# Patient Record
Sex: FEMALE | Race: WHITE | URBAN - METROPOLITAN AREA
[De-identification: names, ages, dates, MRNs, and addresses within clinical notes are randomized per-mention and may not be internally consistent; named-entity substitution may affect disease eponyms.]

---

## 2021-08-28 ENCOUNTER — TELEPHONE ENCOUNTER (OUTPATIENT)
Dept: URBAN - METROPOLITAN AREA CLINIC 13 | Facility: CLINIC | Age: 61
End: 2021-08-28

## 2021-08-29 ENCOUNTER — TELEPHONE ENCOUNTER (OUTPATIENT)
Dept: URBAN - METROPOLITAN AREA CLINIC 13 | Facility: CLINIC | Age: 61
End: 2021-08-29

## 2022-04-16 ENCOUNTER — OUT OF OFFICE VISIT (OUTPATIENT)
Dept: URBAN - METROPOLITAN AREA MEDICAL CENTER 1 | Facility: MEDICAL CENTER | Age: 62
End: 2022-04-16
Payer: MEDICARE

## 2022-04-16 DIAGNOSIS — K92.2 ACUTE GASTROINTESTINAL BLEEDING: ICD-10-CM

## 2022-04-16 DIAGNOSIS — R11.2 ACUTE NAUSEA WITH NONBILIOUS VOMITING: ICD-10-CM

## 2022-04-16 PROCEDURE — 99222 1ST HOSP IP/OBS MODERATE 55: CPT | Performed by: NURSE PRACTITIONER

## 2022-04-16 PROCEDURE — G8427 DOCREV CUR MEDS BY ELIG CLIN: HCPCS | Performed by: NURSE PRACTITIONER

## 2022-05-09 ENCOUNTER — WEB ENCOUNTER (OUTPATIENT)
Dept: URBAN - NONMETROPOLITAN AREA CLINIC 2 | Facility: CLINIC | Age: 62
End: 2022-05-09

## 2022-05-09 ENCOUNTER — OFFICE VISIT (OUTPATIENT)
Dept: URBAN - NONMETROPOLITAN AREA CLINIC 2 | Facility: CLINIC | Age: 62
End: 2022-05-09
Payer: MEDICARE

## 2022-05-09 VITALS
HEART RATE: 112 BPM | DIASTOLIC BLOOD PRESSURE: 64 MMHG | WEIGHT: 125.6 LBS | SYSTOLIC BLOOD PRESSURE: 89 MMHG | BODY MASS INDEX: 20.93 KG/M2 | HEIGHT: 65 IN

## 2022-05-09 DIAGNOSIS — C20 RECTAL CANCER: ICD-10-CM

## 2022-05-09 DIAGNOSIS — C71.9 MALIGNANT NEOPLASM OF BRAIN, UNSPECIFIED LOCATION: ICD-10-CM

## 2022-05-09 DIAGNOSIS — K94.01 BLEEDING FROM COLOSTOMY: ICD-10-CM

## 2022-05-09 DIAGNOSIS — K21.9 GASTROESOPHAGEAL REFLUX DISEASE, UNSPECIFIED WHETHER ESOPHAGITIS PRESENT: ICD-10-CM

## 2022-05-09 DIAGNOSIS — J44.9 CHRONIC OBSTRUCTIVE PULMONARY DISEASE, UNSPECIFIED COPD TYPE: ICD-10-CM

## 2022-05-09 PROCEDURE — 99203 OFFICE O/P NEW LOW 30 MIN: CPT | Performed by: INTERNAL MEDICINE

## 2022-05-09 RX ORDER — LEVETIRACETAM 1000 MG/1
TABLET, FILM COATED ORAL
Qty: 60 | Status: ACTIVE | COMMUNITY

## 2022-05-09 RX ORDER — CYCLOBENZAPRINE HYDROCHLORIDE 5 MG/1
TAKE 1 TABLET BY MOUTH THREE TIMES DAILY AS NEEDED TABLET, FILM COATED ORAL
Qty: 84 | Refills: 0 | Status: ACTIVE | COMMUNITY

## 2022-05-09 RX ORDER — PANTOPRAZOLE SODIUM 40 MG/1
TABLET, DELAYED RELEASE ORAL
Qty: 30 | Status: ACTIVE | COMMUNITY

## 2022-05-09 RX ORDER — HYDROCODONE BITARTRATE AND ACETAMINOPHEN 10; 325 MG/1; MG/1
TABLET ORAL
Qty: 112 | Status: ACTIVE | COMMUNITY

## 2022-05-09 RX ORDER — FAMOTIDINE 20 MG/1
TAKE 1 TABLET BY MOUTH ONCE DAILY FOR 28 DAYS TABLET, FILM COATED ORAL
Qty: 60 | Refills: 0 | Status: ACTIVE | COMMUNITY

## 2022-05-09 RX ORDER — NYSTATIN 100000 [USP'U]/ML
SUSPENSION ORAL
Qty: 120 | Status: ACTIVE | COMMUNITY

## 2022-05-09 RX ORDER — FENTANYL 12 UG/H
PATCH, EXTENDED RELEASE TRANSDERMAL
Qty: 10 | Status: ACTIVE | COMMUNITY

## 2022-05-09 RX ORDER — ESCITALOPRAM 20 MG/1
TABLET, FILM COATED ORAL
Qty: 90 | Status: ACTIVE | COMMUNITY

## 2022-05-09 RX ORDER — ZOLPIDEM TARTRATE 5 MG/1
TAKE 1 TABLET BY MOUTH ONCE DAILY AT BEDTIME AS NEEDED TABLET ORAL
Qty: 30 | Refills: 0 | Status: ACTIVE | COMMUNITY

## 2022-05-09 RX ORDER — GABAPENTIN 300 MG/1
CAPSULE ORAL
Qty: 84 | Status: ACTIVE | COMMUNITY

## 2022-05-09 NOTE — HPI-TODAY'S VISIT:
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 undergoing radiation, neuropathy who was hospitalized for bright red blood in her ostomy bag in 4/2022.   She was evaluated by GI consultation service. Given stable H/H and hemodynamic stability endoscopic evaluation was not performed.  Ms. Eric is followed with Dr. Christine. There was discussion regarding reversal of ileostomy. A flexible sigmoidosocpy was performed which showed extrinsic mild stenosis at the recto-sigmoid junction.  Ms. Eric has not had any blood in her ostomy. SHe has mustard yellow stool in her ostomy. Denies NSAID use. Denies systemic anticoagulation.   On famotidine and pantoprazole.   Unable to work currently but previously worked as a  at [employer] as well as [employer].  Currently requires supplemental oxygen.

## 2022-05-09 NOTE — PHYSICAL EXAM CONSTITUTIONAL:
chronically ill appearing on supplemental oxygen, sitting in wheelchair , normal communication ability

## 2022-05-09 NOTE — PHYSICAL EXAM GASTROINTESTINAL
Abdomen , ostomy bag with brown stool , surgical scars on abdomen are well-healed , soft, nontender, nondistended , no guarding or rigidity , no masses palpable , normal bowel sounds , Liver and Spleen , no hepatomegaly present , no hepatosplenomegaly , liver nontender , spleen not palpable

## 2022-05-26 ENCOUNTER — DASHBOARD ENCOUNTERS (OUTPATIENT)
Age: 62
End: 2022-05-26

## 2022-11-06 PROBLEM — 1085451000119107: Status: ACTIVE | Noted: 2022-05-26

## 2022-11-06 PROBLEM — 235595009: Status: ACTIVE | Noted: 2022-05-26

## 2022-11-06 PROBLEM — 13645005: Status: ACTIVE | Noted: 2022-05-26

## 2022-11-06 PROBLEM — 363351006: Status: ACTIVE | Noted: 2022-05-26

## 2022-11-06 PROBLEM — 428061005: Status: ACTIVE | Noted: 2022-05-26

## 2022-11-07 ENCOUNTER — OFFICE VISIT (OUTPATIENT)
Dept: URBAN - NONMETROPOLITAN AREA CLINIC 2 | Facility: CLINIC | Age: 62
End: 2022-11-07

## 2022-11-07 RX ORDER — GABAPENTIN 300 MG/1
CAPSULE ORAL
Qty: 84 | Status: ACTIVE | COMMUNITY

## 2022-11-07 RX ORDER — FAMOTIDINE 20 MG/1
TAKE 1 TABLET BY MOUTH ONCE DAILY FOR 28 DAYS TABLET, FILM COATED ORAL
Qty: 60 | Refills: 0 | Status: ACTIVE | COMMUNITY

## 2022-11-07 RX ORDER — CYCLOBENZAPRINE HYDROCHLORIDE 5 MG/1
TAKE 1 TABLET BY MOUTH THREE TIMES DAILY AS NEEDED TABLET, FILM COATED ORAL
Qty: 84 | Refills: 0 | Status: ACTIVE | COMMUNITY

## 2022-11-07 RX ORDER — HYDROCODONE BITARTRATE AND ACETAMINOPHEN 10; 325 MG/1; MG/1
TABLET ORAL
Qty: 112 | Status: ACTIVE | COMMUNITY

## 2022-11-07 RX ORDER — NYSTATIN 100000 [USP'U]/ML
SUSPENSION ORAL
Qty: 120 | Status: ACTIVE | COMMUNITY

## 2022-11-07 RX ORDER — ZOLPIDEM TARTRATE 5 MG/1
TAKE 1 TABLET BY MOUTH ONCE DAILY AT BEDTIME AS NEEDED TABLET ORAL
Qty: 30 | Refills: 0 | Status: ACTIVE | COMMUNITY

## 2022-11-07 RX ORDER — PANTOPRAZOLE SODIUM 40 MG/1
TABLET, DELAYED RELEASE ORAL
Qty: 30 | Status: ACTIVE | COMMUNITY

## 2022-11-07 RX ORDER — ESCITALOPRAM 20 MG/1
TABLET, FILM COATED ORAL
Qty: 90 | Status: ACTIVE | COMMUNITY

## 2022-11-07 RX ORDER — FENTANYL 12 UG/H
PATCH, EXTENDED RELEASE TRANSDERMAL
Qty: 10 | Status: ACTIVE | COMMUNITY

## 2022-11-07 RX ORDER — LEVETIRACETAM 1000 MG/1
TABLET, FILM COATED ORAL
Qty: 60 | Status: ACTIVE | COMMUNITY

## 2022-11-07 NOTE — HPI-TODAY'S VISIT:
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 undergoing radiation, neuropathy who was hospitalized for bright red blood in her ostomy bag in 4/2022.   She was evaluated by GI consultation service. Given stable H/H and hemodynamic stability endoscopic evaluation was not performed.  Ms. Eric is followed with Dr. Christine. There was discussion regarding reversal of ileostomy. A flexible sigmoidoscopy was performed which showed extrinsic mild stenosis at the recto-sigmoid junction.  Ms. Eric has not had any blood in her ostomy. She has mustard yellow stool in her ostomy. Denies NSAID use. Denies systemic anticoagulation.   On famotidine and pantoprazole.   Unable to work currently but previously worked as a  at [employer] as well as [employer].   Currently requires supplemental oxygen.  Plan: -Discuss EGD + colonoscopy pending health.